# Patient Record
Sex: FEMALE | Race: WHITE | NOT HISPANIC OR LATINO | Employment: UNEMPLOYED | URBAN - METROPOLITAN AREA
[De-identification: names, ages, dates, MRNs, and addresses within clinical notes are randomized per-mention and may not be internally consistent; named-entity substitution may affect disease eponyms.]

---

## 2022-11-03 PROBLEM — I10 PRIMARY HYPERTENSION: Status: ACTIVE | Noted: 2022-11-03

## 2023-09-08 PROBLEM — S82.831A: Status: RESOLVED | Noted: 2023-09-08 | Resolved: 2023-09-08

## 2023-09-08 PROBLEM — S82.839D: Status: ACTIVE | Noted: 2023-09-08

## 2023-09-08 PROBLEM — S82.831A: Status: ACTIVE | Noted: 2023-09-08

## 2023-09-08 PROBLEM — S82.839D: Status: RESOLVED | Noted: 2023-09-08 | Resolved: 2023-09-08

## 2023-09-08 PROBLEM — S82.61XD CLOSED DISPLACED FRACTURE OF LATERAL MALLEOLUS OF RIGHT FIBULA WITH ROUTINE HEALING: Status: ACTIVE | Noted: 2023-09-08

## 2024-04-29 ENCOUNTER — PATIENT OUTREACH (OUTPATIENT)
Dept: ADMINISTRATIVE | Facility: OTHER | Age: 69
End: 2024-04-29

## 2024-05-09 ENCOUNTER — TELEPHONE (OUTPATIENT)
Dept: ADMINISTRATIVE | Facility: OTHER | Age: 69
End: 2024-05-09

## 2024-05-09 NOTE — PATIENT INSTRUCTIONS
If you have any questions call Sadie [phone].       [facility]  [phone] [facility]    Food Ph[employer]    [facility]  [address] [place] [address]    [facility]  [address] [place] [address]    [facility]  [address] [address]    [facility]  [address]  [place] [address]    [facility]  [address] [place] [address]    [employer] [facility] [place]  [id number]  [place] [address]    [facility]  [id number]  [place] [address]    [facility]  [address] [place] [address]    [facility]  [address] [place] [place] [address]          [facility]  [address] [place] [address]      [employer] [facility]   [address] [place] [address]    Transportation  [employer] [facility] [phone] within [facility]      Utility Assistance  [place] Emergency utility Assistance Program    LIHEAP (Low Income Home Energy Assistance Program 1-398.605.4710    [place] .gov    www.[place].gov>energy -assistance    [facility] 1-735.390.7249 or 1-590.631.7633    [facility]  [phone] [place]

## 2024-05-09 NOTE — PROGRESS NOTES
CHW - Initial Contact    This LPN completed the Social Determinant of Health questionnaire with patient via telephone today.    Pt identified barriers of most importance are: Transportation   Referrals to community agencies completed with patient/caregiver consent outside of [facility] include:  No  Referrals were put through [facility] - no:   Support and Services: Completed SDOH questionnaire, mailed resources, called several mental health providers trying to find someone that is accepting new patients and the patient's insurance.  Other information discussed the patient needs / wants help with: Food, mental health referral   Follow up required: Yes  Follow-up Outreach - Due: 5/13/2024       LPN spoke to patient/caregiver as per [facility] referral for: Transportation to appointments    Does the patient consent to completing the assessment/enrollment: Yes  Does the patient consent for LPN to speak to a caregiver? No    Health Insurance Coverage:     Does the patient have adequate health insurance coverage? Yes  Education provided: No    PCP Follow-Up Appointments:    Does the patient have a primary care provider? yes - Dr. Kevon Leo  Date of last PCP appointment? 4/3/2024  Next PCP appointment:  5/24/2024  1300  Was patient provided with education surrounding PCP services/creating a medical home? yes - Educated on the use of urgent care clinic for non emergent issues when PCP unavailable.      Specialist Appointments:     Does the patient have a pending specialist referral? yes - Psychiatry with Dr. Napoleon Saunders  Does the patient have an upcoming specialist appointment? no  Is the patient pregnant? No  Does the patient have a mental health provider? No, PCP is currently treating mental health.  She denies SI/HI.  Instructed if she develops SI/HI to present to the ED for evaluation, educated on 988.        Home Medications:     Reviewed medication list with patient? No  Is the patient able to afford their medications? No,  Patient states she cannot afford Abilify that is due at the end of the month, but she wants to wait on referral to the Pharmacy Prescription Assistance Program until she has see the psychiatrist.       Care Gaps:     Does the patient have any care gaps that are due? Yes    Care Gaps     Overdue          Never done TETANUS VACCINE (Every 10 Years)     Never done DEXA Scan (Every 3 Years)   Last ordered: Oct 25, 2023     Never done Shingles Vaccine (1 of 2)     Never done RSV Vaccine (Age 60+ and Pregnant patients) (1 - 1-dose 60+ series)     SEP 1  2023 COVID-19 Vaccine (4 - 2023-24 season)  Last completed: Apr 21, 2021     Recent lab results:  Blood Sugar: N/A   Provided education: No  Blood Pressure: Did not check   Provided education: Yes, Educated to take BP daily, record and bring to PCP appointments.  Educated on a low salt diet.        Social Determinants of Health (SDOH)    Patient's identified areas of need: Transportation, food, mental health provider referral  Education/Resources provided:  Yes      Home Health/DME:    Current Home Health: No  Patient has all healthcare equipment/supplies indicated: yes  Current DME:  [employer] Cuff    Additional Documentation:   Identity verified.  Patient states she needs assistance with transportation.  She states she has not contacted her insurance to determine if she has benefits.  Will contact insurance.  Provided phone number for [employer], [phone].  Patient states she also has food insecurity.  She states she does not qualify for SNAP benefits because she makes too much money.  Offered to mail food bank resources, patient accepted.  Offered to mail utility resources in the event she may need in the future, patient accepted.  Also instructed to contact [facility] on [facility] to apply for Meals on Wheels.  Patient states she has not gotten an appointment with Dr. Napoleon Saunders, psychiatry.  Will follow up on referral.  Patient verbalized understanding to all  instructions.      Follow up:   Patient agrees to scheduled follow up call. Yes

## 2024-05-09 NOTE — TELEPHONE ENCOUNTER
Good afternoon,     Ms. Collette states she has not gotten an appointment with Dr. Napoleon Saunders.  Phoned Dr. Saunders's office at [phone] and received no answer.  Is this the correct number?  If not, please provide the correct number so that a call can be made to determine of the referral was received.      Thank you,     Sadie Connors LPN Care Coordinator  [phone]

## 2024-05-13 ENCOUNTER — PATIENT OUTREACH (OUTPATIENT)
Dept: ADMINISTRATIVE | Facility: OTHER | Age: 69
End: 2024-05-13

## 2024-05-13 NOTE — PROGRESS NOTES
CHW - Follow Up    This LPN completed a follow up visit with patient via telephone today.  Pt/Caregiver reported: Identity verified.  LPN provided: Informed patient that she has no transportation benefits through her insurance.  Instructed to contact [employer] to apply and set up transportation for her upcoming appointments.  Informed her that finding a mental health provided has proven to be difficult.  Informed patient that a message was sent to her PCP to find out if the mental health provider needs to be a physician or would an NP be acceptable.  Patient stats she will discuss with PCP at appointment next week.  Patient verbalized understanding to all instructions.  Follow up required: Yes  Follow-up Outreach - Due: 5/20/2024

## 2024-05-14 ENCOUNTER — PATIENT OUTREACH (OUTPATIENT)
Dept: ADMINISTRATIVE | Facility: OTHER | Age: 69
End: 2024-05-14

## 2024-05-14 NOTE — PROGRESS NOTES
CHW - Follow Up    This LPN completed a follow up visit with patient via telephone today.  Pt/Caregiver reported: Identity verified.  LPN provided: Informed patient of mental health provider appointment with YUDITH Conrad on 5/21/2024 11:15.  After speaking with patient it was noted that [facility] is in [facility].  Patient has transportation issues.  Offered to contact other clinics in [facility], patient declines.  She states she will discuss with PCP next week.  Follow up required: Yes  Follow-up Outreach - Due: 6/11/2024

## 2024-06-11 ENCOUNTER — PATIENT OUTREACH (OUTPATIENT)
Dept: ADMINISTRATIVE | Facility: OTHER | Age: 69
End: 2024-06-11